# Patient Record
Sex: FEMALE | HISPANIC OR LATINO
[De-identification: names, ages, dates, MRNs, and addresses within clinical notes are randomized per-mention and may not be internally consistent; named-entity substitution may affect disease eponyms.]

---

## 2024-04-30 PROBLEM — Z00.00 ENCOUNTER FOR PREVENTIVE HEALTH EXAMINATION: Status: ACTIVE | Noted: 2024-04-30

## 2024-05-07 ENCOUNTER — APPOINTMENT (OUTPATIENT)
Dept: SURGICAL ONCOLOGY | Facility: CLINIC | Age: 39
End: 2024-05-07
Payer: COMMERCIAL

## 2024-05-07 VITALS
OXYGEN SATURATION: 98 % | DIASTOLIC BLOOD PRESSURE: 81 MMHG | SYSTOLIC BLOOD PRESSURE: 124 MMHG | HEART RATE: 78 BPM | BODY MASS INDEX: 22.78 KG/M2 | WEIGHT: 113 LBS | HEIGHT: 59 IN

## 2024-05-07 DIAGNOSIS — Z78.9 OTHER SPECIFIED HEALTH STATUS: ICD-10-CM

## 2024-05-07 DIAGNOSIS — N60.09 SOLITARY CYST OF UNSPECIFIED BREAST: ICD-10-CM

## 2024-05-07 PROCEDURE — 99245 OFF/OP CONSLTJ NEW/EST HI 55: CPT

## 2024-05-07 NOTE — ASSESSMENT
[FreeTextEntry1] : IMP: 39 year old female presents with a left breast cyst and a family hx. of breast cancer   Plan:  Left breast sono 7/2024 RTO after imaging. Then yearly imaging

## 2024-05-07 NOTE — HISTORY OF PRESENT ILLNESS
[de-identified] : Ms. NELIA MCNULTY is a 39 year old female who presents today for initial consultation for breast exam. Referred by Dr. Mario Cox  She has a family hx. of breast cancer in her maternal aunt. she has alwys been cystic but no dominant masses She is nulliparous.  B/L breast sono 1/10/2024: Left retroareolar questioned artifact from nipple shadowing: Technical repeat US recommended. Left breast 1:00 N1, questioned complicated cyst: Technical repeat or six month f/u is recommended.   **ADDENDUM*** Additional images on 1/12/24:  Left breast: 1:00 N1, there is a probable complicated cyst measuring 0.5 x 0.3 0.5 cm. 6 months f/u is recommended. In the retroareolar region, no suspicious solid or complex cystic mass is detected. BIRADS 3   Bilateral mammogram $/2024: BIRADS 1

## 2024-05-07 NOTE — PHYSICAL EXAM
[Normal] : supple, no neck mass and thyroid not enlarged [Normal Supraclavicular Lymph Nodes] : normal supraclavicular lymph nodes [Normal Axillary Lymph Nodes] : normal axillary lymph nodes [Normal] : oriented to person, place and time, with appropriate affect [de-identified] : fibrocystic changes but no masses

## 2024-05-07 NOTE — CONSULT LETTER
[Dear  ___] : Dear  [unfilled], [Consult Letter:] : I had the pleasure of evaluating your patient, [unfilled]. [Please see my note below.] : Please see my note below. [Consult Closing:] : Thank you very much for allowing me to participate in the care of this patient.  If you have any questions, please do not hesitate to contact me. [Sincerely,] : Sincerely, [FreeTextEntry1] : I will keep you informed of the repeat sonogram in July. [FreeTextEntry3] : Martell Ordonez MD FACS Chief of Surgical Oncology

## 2024-07-23 ENCOUNTER — APPOINTMENT (OUTPATIENT)
Dept: SURGICAL ONCOLOGY | Facility: CLINIC | Age: 39
End: 2024-07-23
Payer: COMMERCIAL

## 2024-07-23 VITALS
HEART RATE: 74 BPM | WEIGHT: 113 LBS | SYSTOLIC BLOOD PRESSURE: 113 MMHG | RESPIRATION RATE: 16 BRPM | BODY MASS INDEX: 22.78 KG/M2 | HEIGHT: 59 IN | OXYGEN SATURATION: 100 % | DIASTOLIC BLOOD PRESSURE: 71 MMHG

## 2024-07-23 DIAGNOSIS — N60.09 SOLITARY CYST OF UNSPECIFIED BREAST: ICD-10-CM

## 2024-07-23 PROCEDURE — 99214 OFFICE O/P EST MOD 30 MIN: CPT

## 2024-07-23 NOTE — CONSULT LETTER
[Dear  ___] : Dear  [unfilled], [Consult Letter:] : I had the pleasure of evaluating your patient, [unfilled]. [Please see my note below.] : Please see my note below. [Consult Closing:] : Thank you very much for allowing me to participate in the care of this patient.  If you have any questions, please do not hesitate to contact me. [Sincerely,] : Sincerely, [FreeTextEntry1] : I will keep you informed of the imaging in April, 2025. [FreeTextEntry3] : Martell Ordonez MD FACS Chief of Surgical Oncology

## 2024-07-23 NOTE — HISTORY OF PRESENT ILLNESS
[de-identified] : Ms. NELIA MCNULTY is a 39 year old woman here for a follow-up visit. Referred by Dr. Mario Cox  She has a family hx. of breast cancer in her maternal aunt. she has always been cystic but no dominant masses She is nulliparous.  B/L U/S 1/10/2024 - L 1:00 N1, probable complicated cyst measuring 5 mm -> f/u L U/S in 6 months - in L RA region, no suspicious solid or complex cystic mass BIRADS 3   B/L mammo 4/2024 - BIRADS 1  f/u LEFT U/S 7/2024 (@ [facility]):  - L 1:00 N1, 4 mm complicated cyst, not significantly changed & remains probably benign -> f/u in 6 months w/ annual imaging BI-RADS 3

## 2024-07-23 NOTE — PHYSICAL EXAM
[Normal] : supple, no neck mass and thyroid not enlarged [Normal Supraclavicular Lymph Nodes] : normal supraclavicular lymph nodes [Normal Axillary Lymph Nodes] : normal axillary lymph nodes [Normal] : oriented to person, place and time, with appropriate affect [de-identified] : fibrocystic changes but no masses

## 2024-07-23 NOTE — ASSESSMENT
[FreeTextEntry1] : IMP:  40yo F w/ left breast cyst & famHX  f/u LEFT U/S 7/2024 (@ [facility]):  - L 1:00 N1, 4 mm complicated cyst, not significantly changed & remains probably benign -> f/u in 6 months w/ annual imaging BI-RADS 3   PLAN: B/L mammo/sono 4/2025 RTO after imaging then yearly

## 2024-07-23 NOTE — PHYSICAL EXAM
[Normal] : supple, no neck mass and thyroid not enlarged [Normal Supraclavicular Lymph Nodes] : normal supraclavicular lymph nodes [Normal Axillary Lymph Nodes] : normal axillary lymph nodes [Normal] : oriented to person, place and time, with appropriate affect [de-identified] : fibrocystic changes but no masses

## 2024-07-23 NOTE — HISTORY OF PRESENT ILLNESS
[de-identified] : Ms. NELIA MCNULTY is a 39 year old woman here for a follow-up visit. Referred by Dr. Mario Cox  She has a family hx. of breast cancer in her maternal aunt. she has always been cystic but no dominant masses She is nulliparous.  B/L U/S 1/10/2024 - L 1:00 N1, probable complicated cyst measuring 5 mm -> f/u L U/S in 6 months - in L RA region, no suspicious solid or complex cystic mass BIRADS 3   B/L mammo 4/2024 - BIRADS 1  f/u LEFT U/S 7/2024 (@ [facility]):  - L 1:00 N1, 4 mm complicated cyst, not significantly changed & remains probably benign -> f/u in 6 months w/ annual imaging BI-RADS 3

## 2024-07-23 NOTE — ASSESSMENT
[FreeTextEntry1] : IMP:  38yo F w/ left breast cyst & famHX  f/u LEFT U/S 7/2024 (@ [facility]):  - L 1:00 N1, 4 mm complicated cyst, not significantly changed & remains probably benign -> f/u in 6 months w/ annual imaging BI-RADS 3   PLAN: B/L mammo/sono 4/2025 RTO after imaging then yearly

## 2024-09-30 NOTE — REVIEW OF SYSTEMS
[Negative] : Heme/Lymph
Simple: Patient demonstrates quick and easy understanding/Verbalized Understanding

## 2025-05-06 ENCOUNTER — NON-APPOINTMENT (OUTPATIENT)
Age: 40
End: 2025-05-06

## 2025-05-06 ENCOUNTER — APPOINTMENT (OUTPATIENT)
Dept: SURGICAL ONCOLOGY | Facility: CLINIC | Age: 40
End: 2025-05-06
Payer: COMMERCIAL

## 2025-05-06 VITALS
RESPIRATION RATE: 16 BRPM | HEIGHT: 59 IN | SYSTOLIC BLOOD PRESSURE: 118 MMHG | HEART RATE: 69 BPM | WEIGHT: 113 LBS | OXYGEN SATURATION: 100 % | BODY MASS INDEX: 22.78 KG/M2 | DIASTOLIC BLOOD PRESSURE: 72 MMHG

## 2025-05-06 DIAGNOSIS — N60.09 SOLITARY CYST OF UNSPECIFIED BREAST: ICD-10-CM

## 2025-05-06 PROCEDURE — 99214 OFFICE O/P EST MOD 30 MIN: CPT
